# Patient Record
(demographics unavailable — no encounter records)

---

## 2025-06-26 NOTE — PHYSICAL EXAM
[No Acute Distress] : no acute distress [Normal S1, S2] : normal S1, S2 [No Murmur] : no murmur [Clear Lung Fields] : clear lung fields [Good Air Entry] : good air entry [Normal Gait] : normal gait [No Edema] : no edema [Alert and Oriented] : alert and oriented [de-identified] : No JVD

## 2025-06-26 NOTE — DISCUSSION/SUMMARY
[With Me] : with me [___ Year(s)] : in [unfilled] year(s) [FreeTextEntry1] : Electrocardiogram: ECG has a similar appearance to last tracing with deep T wave inversions; she has no angina, and an ischemia evaluation was normal; echocardiogram with LVH.  No further workup planned at this time.  Hypertension: Improved/satisfactory control; continue present doses of losartan, amlodipine,  Hyperlipidemia:  Controlled with recent LDL 87; continue  atorvastatin.  Diabetes mellitus: No recent labs to review but patient describes satisfactory control.

## 2025-06-26 NOTE — REVIEW OF SYSTEMS
[Chest Discomfort] : chest discomfort [Dyspnea on exertion] : not dyspnea during exertion [Palpitations] : no palpitations

## 2025-06-26 NOTE — HISTORY OF PRESENT ILLNESS
[FreeTextEntry1] : Radha Mak is a 74-year-old woman with a history of abnormal ECG (T wave inversions in the anterolateral leads), hypertension, hyperlipidemia, diabetes mellitus, who returns for cardiac examination.  She has been feeling well since I last saw her more than 1 year ago--no new complaints; daily walks with no angina or dyspnea.  She reports recent visit with her primary care physician and says diabetes is well-controlled.

## 2025-06-26 NOTE — CARDIOLOGY SUMMARY
[de-identified] : 6/26/2025. Sinus Rhythm.  Nonspecific.  T-abnormality - consider anterolateral ischemia.  [de-identified] : 2/3/2023.  Normal myocardial perfusion scan with no evidence of infarction or inducible ischemia.  LVEF greater than 70%. [de-identified] : 5/2/2024. Left ventricular systolic function is normal with ejection fraction estimated at 60 to 65 %. Mild (grade 1) left ventricular diastolic dysfunction. Mild left ventricular hypertrophy. Mild tricuspid regurgitation.

## 2025-06-26 NOTE — REASON FOR VISIT
[CV Risk Factors and Non-Cardiac Disease] : CV risk factors and non-cardiac disease [Arrhythmia/ECG Abnorrmalities] : arrhythmia/ECG abnormalities [Hyperlipidemia] : hyperlipidemia [Hypertension] : hypertension [Source: ______] : History obtained from [unfilled]